# Patient Record
Sex: FEMALE | NOT HISPANIC OR LATINO
[De-identification: names, ages, dates, MRNs, and addresses within clinical notes are randomized per-mention and may not be internally consistent; named-entity substitution may affect disease eponyms.]

---

## 2019-07-09 ENCOUNTER — APPOINTMENT (OUTPATIENT)
Dept: FAMILY MEDICINE | Facility: CLINIC | Age: 34
End: 2019-07-09
Payer: COMMERCIAL

## 2019-07-09 VITALS
WEIGHT: 136 LBS | DIASTOLIC BLOOD PRESSURE: 74 MMHG | HEIGHT: 62.5 IN | RESPIRATION RATE: 16 BRPM | HEART RATE: 77 BPM | SYSTOLIC BLOOD PRESSURE: 102 MMHG | OXYGEN SATURATION: 98 % | BODY MASS INDEX: 24.4 KG/M2

## 2019-07-09 DIAGNOSIS — B34.9 VIRAL INFECTION, UNSPECIFIED: ICD-10-CM

## 2019-07-09 DIAGNOSIS — R09.82 POSTNASAL DRIP: ICD-10-CM

## 2019-07-09 DIAGNOSIS — R23.8 OTHER SKIN CHANGES: ICD-10-CM

## 2019-07-09 PROCEDURE — 99385 PREV VISIT NEW AGE 18-39: CPT

## 2019-07-09 NOTE — PAST MEDICAL HISTORY
[Menstruating] : menstruating [Regular Cycle Intervals] : have been regular [Approximately ___] : the LMP was approximately [unfilled]

## 2019-07-09 NOTE — PLAN
[FreeTextEntry1] : Agreeable to STD testing. \par Not fasting today, will go to lab. \par \par Referral to Gyn to establish care.\par Recommend trial of OTC Hydrocortisone for itching as needed; advised to apply sparingly up to two times per day.  If not improving, Dermatology evaluation. \par \par Trial of flonase for ongoing post-nasal drip symptoms.

## 2019-07-09 NOTE — HISTORY OF PRESENT ILLNESS
[FreeTextEntry1] : Here to establish care/annual physical.  [de-identified] : Here to establish care/annual physical. \par \par Saw Gyn last year. \par Reports skin itching and irritation behind right ear for ~3 months. \par \par Also post-nasal drip symptoms. \par \par Medications and allergies reviewed.\par

## 2019-07-09 NOTE — HEALTH RISK ASSESSMENT
[No] : No [Patient reported PAP Smear was normal] : Patient reported PAP Smear was normal [With Significant Other] : lives with significant other [# of Members in Household ___] :  household currently consist of [unfilled] member(s) [Employed] : employed [] :  [# Of Children ___] : has [unfilled] children [Reports normal functional visual acuity (ie: able to read med bottle)] : Reports normal functional visual acuity [] : No [de-identified] : None [de-identified] : Gynecology-Last July 2018  [de-identified] : Yoga-2x a week; walking  [de-identified] : No pork, no shrimp, no seafood, red meat.  Eats poultry, fruits/vegetables; mostly water [HIV Test offered] : HIV Test offered [Reports changes in dental health] : Reports no changes in dental health [Reports changes in hearing] : Reports no changes in hearing [Reports changes in vision] : Reports no changes in vision [PapSmearDate] : 07/18 [FreeTextEntry2] :   [de-identified] : reading glasses

## 2019-07-09 NOTE — PHYSICAL EXAM
[Normal TMs] : both tympanic membranes were normal [No Lymphadenopathy] : no lymphadenopathy [Supple] : supple [No Edema] : there was no peripheral edema [No Extremity Clubbing/Cyanosis] : no extremity clubbing/cyanosis [Normal Appearance] : normal in appearance [No Masses] : no palpable masses [No Nipple Discharge] : no nipple discharge [No Axillary Lymphadenopathy] : no axillary lymphadenopathy [Normal] : soft, non-tender, non-distended, no masses palpated, no HSM and normal bowel sounds [Normal Affect] : the affect was normal [Alert and Oriented x3] : oriented to person, place, and time [Normal Mood] : the mood was normal [de-identified] : mild erythema, no exudates [Thyroid Normal, No Nodules] : the thyroid was normal and there were no nodules present [de-identified] : erythema and scaling, right posterior ear, no active drainage

## 2019-07-25 LAB
25(OH)D3 SERPL-MCNC: 17.6 NG/ML
ALBUMIN SERPL ELPH-MCNC: 4.5 G/DL
ALP BLD-CCNC: 74 U/L
ALT SERPL-CCNC: 11 U/L
ANION GAP SERPL CALC-SCNC: 13 MMOL/L
AST SERPL-CCNC: 16 U/L
BASOPHILS # BLD AUTO: 0.03 K/UL
BASOPHILS NFR BLD AUTO: 0.4 %
BILIRUB SERPL-MCNC: 0.2 MG/DL
BUN SERPL-MCNC: 10 MG/DL
C TRACH RRNA SPEC QL NAA+PROBE: NOT DETECTED
CALCIUM SERPL-MCNC: 9.6 MG/DL
CHLORIDE SERPL-SCNC: 102 MMOL/L
CHOLEST SERPL-MCNC: 185 MG/DL
CHOLEST/HDLC SERPL: 2.9 RATIO
CO2 SERPL-SCNC: 24 MMOL/L
CREAT SERPL-MCNC: 0.62 MG/DL
EOSINOPHIL # BLD AUTO: 0.06 K/UL
EOSINOPHIL NFR BLD AUTO: 0.9 %
ESTIMATED AVERAGE GLUCOSE: 111 MG/DL
GLUCOSE SERPL-MCNC: 83 MG/DL
HBA1C MFR BLD HPLC: 5.5 %
HBV SURFACE AB SER QL: NONREACTIVE
HCT VFR BLD CALC: 36.2 %
HDLC SERPL-MCNC: 65 MG/DL
HGB BLD-MCNC: 11.7 G/DL
HIV1+2 AB SPEC QL IA.RAPID: NONREACTIVE
HSV 1+2 IGG SER IA-IMP: NEGATIVE
HSV 1+2 IGG SER IA-IMP: POSITIVE
HSV1 IGG SER QL: 31.5 INDEX
HSV2 IGG SER QL: 0.12 INDEX
IMM GRANULOCYTES NFR BLD AUTO: 0.6 %
LDLC SERPL CALC-MCNC: 99 MG/DL
LYMPHOCYTES # BLD AUTO: 1.75 K/UL
LYMPHOCYTES NFR BLD AUTO: 26.1 %
MAN DIFF?: NORMAL
MCHC RBC-ENTMCNC: 29.2 PG
MCHC RBC-ENTMCNC: 32.3 GM/DL
MCV RBC AUTO: 90.3 FL
MEV IGG FLD QL IA: 74.5 AU/ML
MEV IGG+IGM SER-IMP: POSITIVE
MONOCYTES # BLD AUTO: 0.43 K/UL
MONOCYTES NFR BLD AUTO: 6.4 %
MUV AB SER-ACNC: POSITIVE
MUV IGG SER QL IA: 133 AU/ML
N GONORRHOEA RRNA SPEC QL NAA+PROBE: NOT DETECTED
NEUTROPHILS # BLD AUTO: 4.4 K/UL
NEUTROPHILS NFR BLD AUTO: 65.6 %
PLATELET # BLD AUTO: 336 K/UL
POTASSIUM SERPL-SCNC: 4.2 MMOL/L
PROT SERPL-MCNC: 7.7 G/DL
RBC # BLD: 4.01 M/UL
RBC # FLD: 12.9 %
RUBV IGG FLD-ACNC: 4.7 INDEX
RUBV IGG SER-IMP: POSITIVE
SODIUM SERPL-SCNC: 139 MMOL/L
SOURCE AMPLIFICATION: NORMAL
T PALLIDUM AB SER QL IA: NEGATIVE
TRIGL SERPL-MCNC: 105 MG/DL
TSH SERPL-ACNC: 2.79 UIU/ML
VZV AB TITR SER: POSITIVE
VZV IGG SER IF-ACNC: 1086 INDEX
WBC # FLD AUTO: 6.71 K/UL

## 2019-09-04 ENCOUNTER — RESULT REVIEW (OUTPATIENT)
Age: 34
End: 2019-09-04

## 2019-11-25 ENCOUNTER — APPOINTMENT (OUTPATIENT)
Dept: FAMILY MEDICINE | Facility: CLINIC | Age: 34
End: 2019-11-25

## 2020-07-16 ENCOUNTER — APPOINTMENT (OUTPATIENT)
Dept: FAMILY MEDICINE | Facility: CLINIC | Age: 35
End: 2020-07-16
Payer: COMMERCIAL

## 2020-07-16 VITALS
HEART RATE: 88 BPM | TEMPERATURE: 98.4 F | BODY MASS INDEX: 25.07 KG/M2 | OXYGEN SATURATION: 98 % | WEIGHT: 136.25 LBS | DIASTOLIC BLOOD PRESSURE: 80 MMHG | SYSTOLIC BLOOD PRESSURE: 120 MMHG | HEIGHT: 62 IN

## 2020-07-16 DIAGNOSIS — M25.531 PAIN IN RIGHT WRIST: ICD-10-CM

## 2020-07-16 DIAGNOSIS — J30.2 OTHER SEASONAL ALLERGIC RHINITIS: ICD-10-CM

## 2020-07-16 DIAGNOSIS — Z11.59 ENCOUNTER FOR SCREENING FOR OTHER VIRAL DISEASES: ICD-10-CM

## 2020-07-16 LAB — CYTOLOGY CVX/VAG DOC THIN PREP: NORMAL

## 2020-07-16 PROCEDURE — 99395 PREV VISIT EST AGE 18-39: CPT

## 2020-07-16 RX ORDER — NORGESTIMATE AND ETHINYL ESTRADIOL 0.25-0.035
0.25-35 KIT ORAL DAILY
Refills: 0 | Status: DISCONTINUED | COMMUNITY
Start: 2019-07-09 | End: 2020-07-16

## 2020-07-16 NOTE — PLAN
[FreeTextEntry1] : Patient not fasting.  Will go to lab for fasting bloodwork, rx given.\par Recommending Pre- vitamin. \par \par Discussed COVID antibody test.  Discussed precautions.  Advised antibody presence indicates prior exposure/infection.  Not used to diagnose current infection.\par \par Ortho-Hand-possible carpal tunnel. \par Allergy eval for allergy triggers. \par \par Will make follow-up Gyn appointment.

## 2020-07-16 NOTE — PHYSICAL EXAM
[Normal Sclera/Conjunctiva] : normal sclera/conjunctiva [Normal Oropharynx] : the oropharynx was normal [Normal TMs] : both tympanic membranes were normal [No Lymphadenopathy] : no lymphadenopathy [Supple] : supple [Thyroid Normal, No Nodules] : the thyroid was normal and there were no nodules present [No Edema] : there was no peripheral edema [No Extremity Clubbing/Cyanosis] : no extremity clubbing/cyanosis [Normal] : affect was normal and insight and judgment were intact [de-identified] : no shoulder or elbow pain b/l; +tinel's right wrist

## 2020-07-16 NOTE — HISTORY OF PRESENT ILLNESS
[FreeTextEntry1] : Ms. VIRGIL STEIN presents for annual physical.\par  [de-identified] : Ms. VIRGIL BUTTSAngeliacSANJANA presents for annual physical.\par June 27th LMP-trying to conceive.  Stopped OCP. \par 6 months of right wrist pain-right handed.  No shoulder pain. Does Yoga for exercise.  Fingers bother her sometimes as well. \par \par Would like Covid ab testing.\par Medications and allergies reviewed.  Suffering from prolonged seasonal allergy symptoms.  Unsure of trigger. \par  \par

## 2020-07-16 NOTE — HEALTH RISK ASSESSMENT
[No] : No [With Significant Other] : lives with significant other [Patient reported PAP Smear was normal] : Patient reported PAP Smear was normal [Student] : student [# of Members in Household ___] :  household currently consist of [unfilled] member(s) [Employed] : employed [Graduate School] : graduate school [] :  [# Of Children ___] : has [unfilled] children [Fully functional (bathing, dressing, toileting, transferring, walking, feeding)] : Fully functional (bathing, dressing, toileting, transferring, walking, feeding) [Fully functional (using the telephone, shopping, preparing meals, housekeeping, doing laundry, using] : Fully functional and needs no help or supervision to perform IADLs (using the telephone, shopping, preparing meals, housekeeping, doing laundry, using transportation, managing medications and managing finances) [] : No [de-identified] : None [de-identified] : Gyn-2019 [de-identified] : Yoga and walking-3 days a week  [de-identified] : Avoid red meat, no pork, no shrimp, no seafood [HIV test declined] : HIV test declined [Reports changes in vision] : Reports no changes in vision [Reports changes in dental health] : Reports no changes in dental health [PapSmearDate] : 09/19 [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

## 2020-07-16 NOTE — PAST MEDICAL HISTORY
[Menstruating] : menstruating [Definite ___ (Date)] : the last menstrual period was [unfilled] [de-identified] : off sprintec

## 2020-07-16 NOTE — REVIEW OF SYSTEMS
[Postnasal Drip] : postnasal drip [Joint Pain] : joint pain [Negative] : Neurological [Sore Throat] : no sore throat [FreeTextEntry9] : No elbow or back pain

## 2020-07-20 LAB
25(OH)D3 SERPL-MCNC: 8.6 NG/ML
ALBUMIN SERPL ELPH-MCNC: 4.8 G/DL
ALP BLD-CCNC: 80 U/L
ALT SERPL-CCNC: 11 U/L
ANION GAP SERPL CALC-SCNC: 15 MMOL/L
AST SERPL-CCNC: 15 U/L
BASOPHILS # BLD AUTO: 0.02 K/UL
BASOPHILS NFR BLD AUTO: 0.3 %
BILIRUB SERPL-MCNC: 0.2 MG/DL
BUN SERPL-MCNC: 8 MG/DL
CALCIUM SERPL-MCNC: 9.5 MG/DL
CHLORIDE SERPL-SCNC: 103 MMOL/L
CHOLEST SERPL-MCNC: 156 MG/DL
CHOLEST/HDLC SERPL: 2.7 RATIO
CO2 SERPL-SCNC: 22 MMOL/L
CREAT SERPL-MCNC: 0.61 MG/DL
EOSINOPHIL # BLD AUTO: 0.03 K/UL
EOSINOPHIL NFR BLD AUTO: 0.5 %
ESTIMATED AVERAGE GLUCOSE: 108 MG/DL
GLUCOSE SERPL-MCNC: 83 MG/DL
HBA1C MFR BLD HPLC: 5.4 %
HCT VFR BLD CALC: 37.9 %
HDLC SERPL-MCNC: 59 MG/DL
HGB BLD-MCNC: 11.7 G/DL
IMM GRANULOCYTES NFR BLD AUTO: 0.3 %
LDLC SERPL CALC-MCNC: 77 MG/DL
LYMPHOCYTES # BLD AUTO: 1.82 K/UL
LYMPHOCYTES NFR BLD AUTO: 28.8 %
MAN DIFF?: NORMAL
MCHC RBC-ENTMCNC: 29.1 PG
MCHC RBC-ENTMCNC: 30.9 GM/DL
MCV RBC AUTO: 94.3 FL
MONOCYTES # BLD AUTO: 0.45 K/UL
MONOCYTES NFR BLD AUTO: 7.1 %
NEUTROPHILS # BLD AUTO: 3.99 K/UL
NEUTROPHILS NFR BLD AUTO: 63 %
PLATELET # BLD AUTO: 299 K/UL
POTASSIUM SERPL-SCNC: 4.7 MMOL/L
PROT SERPL-MCNC: 7.1 G/DL
RBC # BLD: 4.02 M/UL
RBC # FLD: 13.2 %
SARS-COV-2 IGG SERPL IA-ACNC: <3.8 AU/ML
SARS-COV-2 IGG SERPL QL IA: NEGATIVE
SODIUM SERPL-SCNC: 140 MMOL/L
TRIGL SERPL-MCNC: 100 MG/DL
TSH SERPL-ACNC: 1.92 UIU/ML
WBC # FLD AUTO: 6.33 K/UL

## 2020-07-23 ENCOUNTER — APPOINTMENT (OUTPATIENT)
Dept: ORTHOPEDIC SURGERY | Facility: CLINIC | Age: 35
End: 2020-07-23
Payer: COMMERCIAL

## 2020-07-23 VITALS
HEART RATE: 98 BPM | DIASTOLIC BLOOD PRESSURE: 85 MMHG | HEIGHT: 62 IN | SYSTOLIC BLOOD PRESSURE: 120 MMHG | BODY MASS INDEX: 25.4 KG/M2 | WEIGHT: 138 LBS

## 2020-07-23 VITALS — TEMPERATURE: 98.5 F

## 2020-07-23 DIAGNOSIS — G56.01 CARPAL TUNNEL SYNDROME, RIGHT UPPER LIMB: ICD-10-CM

## 2020-07-23 DIAGNOSIS — Z78.9 OTHER SPECIFIED HEALTH STATUS: ICD-10-CM

## 2020-07-23 DIAGNOSIS — M67.439 GANGLION, UNSPECIFIED WRIST: ICD-10-CM

## 2020-07-23 PROCEDURE — 76881 US COMPL JOINT R-T W/IMG: CPT | Mod: RT

## 2020-07-23 PROCEDURE — 20612 ASPIRATE/INJ GANGLION CYST: CPT | Mod: RT

## 2020-07-23 PROCEDURE — 99204 OFFICE O/P NEW MOD 45 MIN: CPT | Mod: 25

## 2020-07-23 PROCEDURE — 73110 X-RAY EXAM OF WRIST: CPT | Mod: RT

## 2020-08-20 ENCOUNTER — APPOINTMENT (OUTPATIENT)
Dept: ORTHOPEDIC SURGERY | Facility: CLINIC | Age: 35
End: 2020-08-20

## 2020-09-08 ENCOUNTER — RESULT REVIEW (OUTPATIENT)
Age: 35
End: 2020-09-08

## 2021-04-08 ENCOUNTER — ASOB RESULT (OUTPATIENT)
Age: 36
End: 2021-04-08

## 2021-04-08 ENCOUNTER — APPOINTMENT (OUTPATIENT)
Dept: ANTEPARTUM | Facility: CLINIC | Age: 36
End: 2021-04-08
Payer: COMMERCIAL

## 2021-04-08 PROCEDURE — 76811 OB US DETAILED SNGL FETUS: CPT

## 2021-04-08 PROCEDURE — 99072 ADDL SUPL MATRL&STAF TM PHE: CPT

## 2021-05-20 ENCOUNTER — ASOB RESULT (OUTPATIENT)
Age: 36
End: 2021-05-20

## 2021-05-20 ENCOUNTER — APPOINTMENT (OUTPATIENT)
Dept: ANTEPARTUM | Facility: CLINIC | Age: 36
End: 2021-05-20
Payer: COMMERCIAL

## 2021-05-20 PROCEDURE — 76816 OB US FOLLOW-UP PER FETUS: CPT

## 2021-08-21 ENCOUNTER — TRANSCRIPTION ENCOUNTER (OUTPATIENT)
Age: 36
End: 2021-08-21

## 2021-08-22 ENCOUNTER — INPATIENT (INPATIENT)
Facility: HOSPITAL | Age: 36
LOS: 1 days | Discharge: HOME | End: 2021-08-24
Attending: STUDENT IN AN ORGANIZED HEALTH CARE EDUCATION/TRAINING PROGRAM | Admitting: STUDENT IN AN ORGANIZED HEALTH CARE EDUCATION/TRAINING PROGRAM
Payer: COMMERCIAL

## 2021-08-22 VITALS
DIASTOLIC BLOOD PRESSURE: 81 MMHG | OXYGEN SATURATION: 97 % | HEART RATE: 94 BPM | SYSTOLIC BLOOD PRESSURE: 122 MMHG | RESPIRATION RATE: 18 BRPM

## 2021-08-22 DIAGNOSIS — Z3A.00 WEEKS OF GESTATION OF PREGNANCY NOT SPECIFIED: ICD-10-CM

## 2021-08-22 DIAGNOSIS — O26.899 OTHER SPECIFIED PREGNANCY RELATED CONDITIONS, UNSPECIFIED TRIMESTER: ICD-10-CM

## 2021-08-22 DIAGNOSIS — Z34.80 ENCOUNTER FOR SUPERVISION OF OTHER NORMAL PREGNANCY, UNSPECIFIED TRIMESTER: ICD-10-CM

## 2021-08-22 LAB
BASOPHILS # BLD AUTO: 0.03 K/UL — SIGNIFICANT CHANGE UP (ref 0–0.2)
BASOPHILS NFR BLD AUTO: 0.3 % — SIGNIFICANT CHANGE UP (ref 0–2)
BLD GP AB SCN SERPL QL: NEGATIVE — SIGNIFICANT CHANGE UP
COVID-19 SPIKE DOMAIN AB INTERP: POSITIVE
COVID-19 SPIKE DOMAIN ANTIBODY RESULT: >250 U/ML — HIGH
EOSINOPHIL # BLD AUTO: 0.03 K/UL — SIGNIFICANT CHANGE UP (ref 0–0.5)
EOSINOPHIL NFR BLD AUTO: 0.3 % — SIGNIFICANT CHANGE UP (ref 0–6)
HCT VFR BLD CALC: 37 % — SIGNIFICANT CHANGE UP (ref 34.5–45)
HGB BLD-MCNC: 12 G/DL — SIGNIFICANT CHANGE UP (ref 11.5–15.5)
IMM GRANULOCYTES NFR BLD AUTO: 1.8 % — HIGH (ref 0–1.5)
LYMPHOCYTES # BLD AUTO: 18.2 % — SIGNIFICANT CHANGE UP (ref 13–44)
LYMPHOCYTES # BLD AUTO: 2.05 K/UL — SIGNIFICANT CHANGE UP (ref 1–3.3)
MCHC RBC-ENTMCNC: 27.8 PG — SIGNIFICANT CHANGE UP (ref 27–34)
MCHC RBC-ENTMCNC: 32.4 GM/DL — SIGNIFICANT CHANGE UP (ref 32–36)
MCV RBC AUTO: 85.6 FL — SIGNIFICANT CHANGE UP (ref 80–100)
MONOCYTES # BLD AUTO: 0.91 K/UL — HIGH (ref 0–0.9)
MONOCYTES NFR BLD AUTO: 8.1 % — SIGNIFICANT CHANGE UP (ref 2–14)
NEUTROPHILS # BLD AUTO: 8.04 K/UL — HIGH (ref 1.8–7.4)
NEUTROPHILS NFR BLD AUTO: 71.3 % — SIGNIFICANT CHANGE UP (ref 43–77)
NRBC # BLD: 0 /100 WBCS — SIGNIFICANT CHANGE UP (ref 0–0)
PLATELET # BLD AUTO: 273 K/UL — SIGNIFICANT CHANGE UP (ref 150–400)
RBC # BLD: 4.32 M/UL — SIGNIFICANT CHANGE UP (ref 3.8–5.2)
RBC # FLD: 15.3 % — HIGH (ref 10.3–14.5)
RH IG SCN BLD-IMP: POSITIVE — SIGNIFICANT CHANGE UP
SARS-COV-2 IGG+IGM SERPL QL IA: >250 U/ML — HIGH
SARS-COV-2 IGG+IGM SERPL QL IA: POSITIVE
SARS-COV-2 RNA SPEC QL NAA+PROBE: SIGNIFICANT CHANGE UP
WBC # BLD: 11.26 K/UL — HIGH (ref 3.8–10.5)
WBC # FLD AUTO: 11.26 K/UL — HIGH (ref 3.8–10.5)

## 2021-08-22 RX ORDER — CITRIC ACID/SODIUM CITRATE 300-500 MG
15 SOLUTION, ORAL ORAL EVERY 6 HOURS
Refills: 0 | Status: DISCONTINUED | OUTPATIENT
Start: 2021-08-22 | End: 2021-08-23

## 2021-08-22 RX ORDER — METOCLOPRAMIDE HCL 10 MG
10 TABLET ORAL ONCE
Refills: 0 | Status: DISCONTINUED | OUTPATIENT
Start: 2021-08-22 | End: 2021-08-24

## 2021-08-22 RX ORDER — MORPHINE SULFATE 50 MG/1
2 CAPSULE, EXTENDED RELEASE ORAL ONCE
Refills: 0 | Status: DISCONTINUED | OUTPATIENT
Start: 2021-08-23 | End: 2021-08-23

## 2021-08-22 RX ORDER — OXYTOCIN 10 UNIT/ML
2 VIAL (ML) INJECTION
Qty: 30 | Refills: 0 | Status: DISCONTINUED | OUTPATIENT
Start: 2021-08-22 | End: 2021-08-24

## 2021-08-22 RX ORDER — OXYTOCIN 10 UNIT/ML
333.33 VIAL (ML) INJECTION
Qty: 20 | Refills: 0 | Status: DISCONTINUED | OUTPATIENT
Start: 2021-08-22 | End: 2021-08-24

## 2021-08-22 RX ORDER — OXYTOCIN 10 UNIT/ML
4 VIAL (ML) INJECTION
Qty: 30 | Refills: 0 | Status: DISCONTINUED | OUTPATIENT
Start: 2021-08-22 | End: 2021-08-22

## 2021-08-22 RX ORDER — SODIUM CHLORIDE 9 MG/ML
1000 INJECTION, SOLUTION INTRAVENOUS
Refills: 0 | Status: DISCONTINUED | OUTPATIENT
Start: 2021-08-22 | End: 2021-08-24

## 2021-08-22 RX ORDER — SODIUM CHLORIDE 9 MG/ML
1000 INJECTION, SOLUTION INTRAVENOUS
Refills: 0 | Status: DISCONTINUED | OUTPATIENT
Start: 2021-08-22 | End: 2021-08-23

## 2021-08-22 RX ADMIN — Medication 4 MILLIUNIT(S)/MIN: at 06:46

## 2021-08-22 RX ADMIN — Medication 4 MILLIUNIT(S)/MIN: at 08:14

## 2021-08-22 RX ADMIN — Medication 2 MILLIUNIT(S)/MIN: at 17:15

## 2021-08-22 RX ADMIN — SODIUM CHLORIDE 125 MILLILITER(S): 9 INJECTION, SOLUTION INTRAVENOUS at 06:40

## 2021-08-22 RX ADMIN — SODIUM CHLORIDE 125 MILLILITER(S): 9 INJECTION, SOLUTION INTRAVENOUS at 05:50

## 2021-08-22 NOTE — OB PROVIDER H&P - NSHPPHYSICALEXAM_GEN_ALL_CORE
Vital Signs Last 24 Hrs  T(C): --  T(F): --  HR: 83 (22 Aug 2021 05:43) (74 - 97)  BP: 122/81 (22 Aug 2021 04:51) (122/81 - 122/81)  BP(mean): --  RR: 18 (22 Aug 2021 04:43) (18 - 18)  SpO2: 94% (22 Aug 2021 05:43) (94% - 98%)    EFM: , mod lance, +accel, no decel  [place]: Q2-3m    VE: 1-2/70/-3, +Pooling, +nitrazine  Sono: Vtx

## 2021-08-22 NOTE — OB PROVIDER H&P - HISTORY OF PRESENT ILLNESS
The patient is a 37YO  @ 40wks presenting w/ LOF at 9am. She said it was accompanied by some spotting. She has also been having ctx Q3-5m. PNC uncomplicated. GBS neg. EFW 3400.    OBHx: G1  GYN: Denies  PMSH: Denies  Meds: PNV  All: NKDA  Soc: Denies  FHx: Denies

## 2021-08-22 NOTE — OB RN DELIVERY SUMMARY - NS_SEPSISRSKCALC_OBGYN_ALL_OB_FT
No temperature has been documented for this patient in CPN or on the OB Flowsheet. Ensure the highest temperature during labor was documented on the OB Flowsheet.  No gestational age at birth has been documented. Ensure delivery date/time has been entered above.  Rupture of membranes must be entered above.   EOS calculated successfully. EOS Risk Factor: 0.5/1000 live births ([facility] national incidence); GA=40w;Temp=99.32; ROM=26.117; GBS='Negative'; Antibiotics='No antibiotics or any antibiotics < 2 hrs prior to birth'

## 2021-08-22 NOTE — OB PROVIDER LABOR PROGRESS NOTE - ASSESSMENT
35 yo  @40+0 in labor and now fully dilated and feeling pressure to push.    - start pushing with each contraction  - FHT Cat I: continue to monitor    Dr. Festus Johns, PGY1 37 yo  @40+0 in labor and now fully dilated and feeling pressure to push.    - start pushing with each contraction  - FHT Cat I: continue to monitor  - Anticipate , continue pit    Dr. Festus Johns, PGY1

## 2021-08-22 NOTE — OB NEONATOLOGY/PEDIATRICIAN DELIVERY SUMMARY - NSPEDSNEONOTESA_OBGYN_ALL_OB_FT
Baby is a 40 wk GA Female born to a 37 y/o  mother via C/S for failure to progress. Maternal history uncomplicated. Prenatal history uncomplicated. Maternal BT O+. PNL neg, NR, and immune. GBS neg on 8/3. SROM at 2100 on 21, clear fluids which eventually became meconium stained. Baby born vigorous and crying spontaneously with nuchal x 1. WDSS. Apgars 9/9. EOS 0.39 (Maternal temp 37.4C). Mom plans to breastfeed, would like hepB. COVID status neg.     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

## 2021-08-22 NOTE — OB PROVIDER LABOR PROGRESS NOTE - ASSESSMENT
A/P:  -EFM/[place]  -Continue pitocin  -Peanut ball placed  -Anticipate   Dr. Mortensen in house  Irlanda Tijerina PA-C

## 2021-08-22 NOTE — OB RN DELIVERY SUMMARY - NS_LABORCHARACTER_OBGYN_ALL_OB
Induction of labor-Medicinal/Augmentation of labor/External electronic FM Induction of labor-Medicinal/Augmentation of labor/External electronic FM/Meconium staining

## 2021-08-22 NOTE — OB PROVIDER LABOR PROGRESS NOTE - NS_SUBJECTIVE/OBJECTIVE_OBGYN_ALL_OB_FT
2120 - late entry due to clinical responsibilities    patient evaluated for pushing. Good pushing effort. Baby LOP position with low pubic arch. Attempts to turn the baby to OA was attempted but unsuccessful. Cat I tracing throughout. To continue pushing. Reviewed with the patient the position. We discussed that its possible to deliver OP but more challenging. To continue pushing. Will reevaluate for decent and progress
patient evaluated for head descent. Pushing for almost 2 hours and there is been no fetal head descent. Reviewed this with the patient. Cat I tracing. Discussed that P0 with epidurals can push for 4 hours the studies show. However at this time, there should have been some progress and descent of the head. Reviewed that since Cat I tracing we can continue to push but that there should have been some descent. Patient agrees with my recommendation for CS at this time.  and patient in agreement. will proceed with CS    Sada Mortensen DO
Patient reported the urge to push. Examined with RN in room, patient found to be 10/100/+1
Patient has been feeling more rectal pressure for the past 20 minutes. Examined patient with RN in room.
patient comfortable with epidural. patient evaluated due to 3-4 min decel which recovered with moderate variability. Pitocin was on 10 and turned off.
PA Note:  Patient seen and evaluated at bedside. Patient c/o increased pressure.
patient feels well with epidural. pitocin on 6. feels some pelvic pressure

## 2021-08-22 NOTE — OB RN INTRAOPERATIVE NOTE - NSSELHIDDEN_OBGYN_ALL_OB_FT
[NS_DeliveryAttending1_OBGYN_ALL_OB_FT:[email or web address]=],[NS_DeliveryAssist1_OBGYN_ALL_OB_FT:[id number]=],[NS_DeliveryRN_OBGYN_ALL_OB_FT:[email or web address]=]

## 2021-08-22 NOTE — OB PROVIDER LABOR PROGRESS NOTE - NS_OBIHIFHRDETAILS_OBGYN_ALL_OB_FT
Cat II with decel that recovered with moderate variability
140/mod variability/ + accels/ - decels: Cat I
Cat I
Cat I
135/mod variability/+ accels/- decels: Cat I FHT
140/moderate variability/+accels/no decels

## 2021-08-22 NOTE — OB PROVIDER H&P - ATTENDING COMMENTS
Agree with above. patient P0 SROM in early labor. Patient does not want epidural. Pregnancy course uncomplicated.     EFW 3600  EFM: Cat I  TOCO: 2-3 in ten  Pitocin on 12  SVE 3/100/-2  Forebag palpated and AROM light mec    Plan:  P0 admitted SROM with now light mec  - Continue pitocin  - Epidural if desired  - Consents signed. risks reviewed for vagina, operative vaginal and  section.   - Anticipate     Sada Mortensen DO

## 2021-08-22 NOTE — OB RN DELIVERY SUMMARY - NSSELHIDDEN_OBGYN_ALL_OB_FT
[NS_DeliveryAttending1_OBGYN_ALL_OB_FT:[email or web address]=],[NS_DeliveryAssist1_OBGYN_ALL_OB_FT:[id number]=],[NS_DeliveryRN_OBGYN_ALL_OB_FT:[email or web address]=]

## 2021-08-22 NOTE — OB PROVIDER LABOR PROGRESS NOTE - ASSESSMENT
35 yo  @40+0 in labor with PROM @9p comfortable and making cervical change.    - FHT Cat I, continue external monitoring  - Re-examine when patient feels more pressure/contractions      WILLIAN Johns, PGY1

## 2021-08-22 NOTE — OB PROVIDER LABOR PROGRESS NOTE - ASSESSMENT
Pitocin turned off. patient on side. Making cervical change. Will keep pitocin off until Cat I then restart    Sada Mortensen DO

## 2021-08-22 NOTE — OB PROVIDER H&P - ASSESSMENT
The patient is a 37YO  @ 40w presenting in early labor. She PROM 9p.  -Admit to L&D, routine, labs, IVF  -EFM + Coy cat 1   -PNC: uncomplicated  -GBS neg  -EFW 3300  -IOL w/ pitocin  -Low risk DVT/PPH    msantandreu pgy4 d/w Dr. Mortensen

## 2021-08-23 ENCOUNTER — TRANSCRIPTION ENCOUNTER (OUTPATIENT)
Age: 36
End: 2021-08-23

## 2021-08-23 LAB
BASOPHILS # BLD AUTO: 0.2 K/UL — SIGNIFICANT CHANGE UP (ref 0–0.2)
BASOPHILS NFR BLD AUTO: 0.9 % — SIGNIFICANT CHANGE UP (ref 0–2)
EOSINOPHIL # BLD AUTO: 0 K/UL — SIGNIFICANT CHANGE UP (ref 0–0.5)
EOSINOPHIL NFR BLD AUTO: 0 % — SIGNIFICANT CHANGE UP (ref 0–6)
HCT VFR BLD CALC: 24.4 % — LOW (ref 34.5–45)
HCT VFR BLD CALC: 25.7 % — LOW (ref 34.5–45)
HCT VFR BLD CALC: 26.4 % — LOW (ref 34.5–45)
HGB BLD-MCNC: 7.8 G/DL — LOW (ref 11.5–15.5)
HGB BLD-MCNC: 8.4 G/DL — LOW (ref 11.5–15.5)
HGB BLD-MCNC: 8.6 G/DL — LOW (ref 11.5–15.5)
LYMPHOCYTES # BLD AUTO: 0.2 K/UL — LOW (ref 1–3.3)
LYMPHOCYTES # BLD AUTO: 0.9 % — LOW (ref 13–44)
MANUAL SMEAR VERIFICATION: SIGNIFICANT CHANGE UP
MCHC RBC-ENTMCNC: 28.3 PG — SIGNIFICANT CHANGE UP (ref 27–34)
MCHC RBC-ENTMCNC: 29 PG — SIGNIFICANT CHANGE UP (ref 27–34)
MCHC RBC-ENTMCNC: 29 PG — SIGNIFICANT CHANGE UP (ref 27–34)
MCHC RBC-ENTMCNC: 32 GM/DL — SIGNIFICANT CHANGE UP (ref 32–36)
MCHC RBC-ENTMCNC: 32.6 GM/DL — SIGNIFICANT CHANGE UP (ref 32–36)
MCHC RBC-ENTMCNC: 32.7 GM/DL — SIGNIFICANT CHANGE UP (ref 32–36)
MCV RBC AUTO: 88.4 FL — SIGNIFICANT CHANGE UP (ref 80–100)
MCV RBC AUTO: 88.6 FL — SIGNIFICANT CHANGE UP (ref 80–100)
MCV RBC AUTO: 88.9 FL — SIGNIFICANT CHANGE UP (ref 80–100)
METAMYELOCYTES # FLD: 0.9 % — HIGH (ref 0–0)
MONOCYTES # BLD AUTO: 1.13 K/UL — HIGH (ref 0–0.9)
MONOCYTES NFR BLD AUTO: 5.2 % — SIGNIFICANT CHANGE UP (ref 2–14)
NEUTROPHILS # BLD AUTO: 20.07 K/UL — HIGH (ref 1.8–7.4)
NEUTROPHILS NFR BLD AUTO: 90.4 % — HIGH (ref 43–77)
NEUTS BAND # BLD: 1.7 % — SIGNIFICANT CHANGE UP (ref 0–8)
NRBC # BLD: 0 /100 WBCS — SIGNIFICANT CHANGE UP (ref 0–0)
NRBC # BLD: 0 /100 WBCS — SIGNIFICANT CHANGE UP (ref 0–0)
PLAT MORPH BLD: NORMAL — SIGNIFICANT CHANGE UP
PLATELET # BLD AUTO: 209 K/UL — SIGNIFICANT CHANGE UP (ref 150–400)
PLATELET # BLD AUTO: 210 K/UL — SIGNIFICANT CHANGE UP (ref 150–400)
PLATELET # BLD AUTO: 221 K/UL — SIGNIFICANT CHANGE UP (ref 150–400)
RBC # BLD: 2.76 M/UL — LOW (ref 3.8–5.2)
RBC # BLD: 2.9 M/UL — LOW (ref 3.8–5.2)
RBC # BLD: 2.97 M/UL — LOW (ref 3.8–5.2)
RBC # FLD: 15.8 % — HIGH (ref 10.3–14.5)
RBC # FLD: 15.9 % — HIGH (ref 10.3–14.5)
RBC # FLD: 16 % — HIGH (ref 10.3–14.5)
RBC BLD AUTO: SIGNIFICANT CHANGE UP
T PALLIDUM AB TITR SER: NEGATIVE — SIGNIFICANT CHANGE UP
WBC # BLD: 21.79 K/UL — HIGH (ref 3.8–10.5)
WBC # BLD: 22.13 K/UL — HIGH (ref 3.8–10.5)
WBC # BLD: 24.05 K/UL — HIGH (ref 3.8–10.5)
WBC # FLD AUTO: 21.79 K/UL — HIGH (ref 3.8–10.5)
WBC # FLD AUTO: 22.13 K/UL — HIGH (ref 3.8–10.5)
WBC # FLD AUTO: 24.05 K/UL — HIGH (ref 3.8–10.5)

## 2021-08-23 RX ORDER — FERROUS SULFATE 325(65) MG
325 TABLET ORAL DAILY
Refills: 0 | Status: DISCONTINUED | OUTPATIENT
Start: 2021-08-23 | End: 2021-08-24

## 2021-08-23 RX ORDER — SIMETHICONE 80 MG/1
80 TABLET, CHEWABLE ORAL EVERY 4 HOURS
Refills: 0 | Status: DISCONTINUED | OUTPATIENT
Start: 2021-08-23 | End: 2021-08-24

## 2021-08-23 RX ORDER — KETOROLAC TROMETHAMINE 30 MG/ML
30 SYRINGE (ML) INJECTION EVERY 6 HOURS
Refills: 0 | Status: DISCONTINUED | OUTPATIENT
Start: 2021-08-23 | End: 2021-08-24

## 2021-08-23 RX ORDER — DIPHENHYDRAMINE HCL 50 MG
25 CAPSULE ORAL EVERY 6 HOURS
Refills: 0 | Status: DISCONTINUED | OUTPATIENT
Start: 2021-08-23 | End: 2021-08-24

## 2021-08-23 RX ORDER — FOLIC ACID 0.8 MG
1 TABLET ORAL DAILY
Refills: 0 | Status: DISCONTINUED | OUTPATIENT
Start: 2021-08-23 | End: 2021-08-24

## 2021-08-23 RX ORDER — MAGNESIUM HYDROXIDE 400 MG/1
30 TABLET, CHEWABLE ORAL
Refills: 0 | Status: DISCONTINUED | OUTPATIENT
Start: 2021-08-23 | End: 2021-08-24

## 2021-08-23 RX ORDER — LANOLIN
1 OINTMENT (GRAM) TOPICAL EVERY 6 HOURS
Refills: 0 | Status: DISCONTINUED | OUTPATIENT
Start: 2021-08-23 | End: 2021-08-24

## 2021-08-23 RX ORDER — OXYCODONE HYDROCHLORIDE 5 MG/1
5 TABLET ORAL ONCE
Refills: 0 | Status: DISCONTINUED | OUTPATIENT
Start: 2021-08-23 | End: 2021-08-24

## 2021-08-23 RX ORDER — OXYTOCIN 10 UNIT/ML
333.33 VIAL (ML) INJECTION
Qty: 20 | Refills: 0 | Status: DISCONTINUED | OUTPATIENT
Start: 2021-08-23 | End: 2021-08-24

## 2021-08-23 RX ORDER — HEPARIN SODIUM 5000 [USP'U]/ML
5000 INJECTION INTRAVENOUS; SUBCUTANEOUS EVERY 12 HOURS
Refills: 0 | Status: DISCONTINUED | OUTPATIENT
Start: 2021-08-23 | End: 2021-08-24

## 2021-08-23 RX ORDER — OXYCODONE HYDROCHLORIDE 5 MG/1
5 TABLET ORAL
Refills: 0 | Status: DISCONTINUED | OUTPATIENT
Start: 2021-08-23 | End: 2021-08-24

## 2021-08-23 RX ORDER — SODIUM CHLORIDE 9 MG/ML
1000 INJECTION, SOLUTION INTRAVENOUS
Refills: 0 | Status: DISCONTINUED | OUTPATIENT
Start: 2021-08-23 | End: 2021-08-24

## 2021-08-23 RX ORDER — ASCORBIC ACID 60 MG
500 TABLET,CHEWABLE ORAL THREE TIMES A DAY
Refills: 0 | Status: DISCONTINUED | OUTPATIENT
Start: 2021-08-23 | End: 2021-08-24

## 2021-08-23 RX ORDER — IBUPROFEN 200 MG
600 TABLET ORAL EVERY 6 HOURS
Refills: 0 | Status: COMPLETED | OUTPATIENT
Start: 2021-08-23 | End: 2022-07-22

## 2021-08-23 RX ORDER — ACETAMINOPHEN 500 MG
975 TABLET ORAL
Refills: 0 | Status: DISCONTINUED | OUTPATIENT
Start: 2021-08-23 | End: 2021-08-24

## 2021-08-23 RX ORDER — TETANUS TOXOID, REDUCED DIPHTHERIA TOXOID AND ACELLULAR PERTUSSIS VACCINE, ADSORBED 5; 2.5; 8; 8; 2.5 [IU]/.5ML; [IU]/.5ML; UG/.5ML; UG/.5ML; UG/.5ML
0.5 SUSPENSION INTRAMUSCULAR ONCE
Refills: 0 | Status: DISCONTINUED | OUTPATIENT
Start: 2021-08-23 | End: 2021-08-24

## 2021-08-23 RX ADMIN — Medication 500 MILLIGRAM(S): at 15:15

## 2021-08-23 RX ADMIN — Medication 975 MILLIGRAM(S): at 03:39

## 2021-08-23 RX ADMIN — Medication 30 MILLIGRAM(S): at 19:45

## 2021-08-23 RX ADMIN — Medication 1 MILLIGRAM(S): at 13:04

## 2021-08-23 RX ADMIN — SODIUM CHLORIDE 75 MILLILITER(S): 9 INJECTION, SOLUTION INTRAVENOUS at 15:16

## 2021-08-23 RX ADMIN — HEPARIN SODIUM 5000 UNIT(S): 5000 INJECTION INTRAVENOUS; SUBCUTANEOUS at 21:18

## 2021-08-23 RX ADMIN — Medication 975 MILLIGRAM(S): at 21:45

## 2021-08-23 RX ADMIN — Medication 975 MILLIGRAM(S): at 15:15

## 2021-08-23 RX ADMIN — Medication 30 MILLIGRAM(S): at 13:30

## 2021-08-23 RX ADMIN — Medication 975 MILLIGRAM(S): at 09:27

## 2021-08-23 RX ADMIN — Medication 975 MILLIGRAM(S): at 04:09

## 2021-08-23 RX ADMIN — Medication 325 MILLIGRAM(S): at 13:04

## 2021-08-23 RX ADMIN — Medication 975 MILLIGRAM(S): at 15:45

## 2021-08-23 RX ADMIN — Medication 975 MILLIGRAM(S): at 21:17

## 2021-08-23 RX ADMIN — HEPARIN SODIUM 5000 UNIT(S): 5000 INJECTION INTRAVENOUS; SUBCUTANEOUS at 09:27

## 2021-08-23 RX ADMIN — Medication 30 MILLIGRAM(S): at 06:10

## 2021-08-23 RX ADMIN — Medication 30 MILLIGRAM(S): at 13:03

## 2021-08-23 RX ADMIN — Medication 30 MILLIGRAM(S): at 20:15

## 2021-08-23 RX ADMIN — Medication 500 MILLIGRAM(S): at 21:18

## 2021-08-23 RX ADMIN — Medication 975 MILLIGRAM(S): at 10:00

## 2021-08-23 RX ADMIN — Medication 1 TABLET(S): at 15:14

## 2021-08-23 RX ADMIN — Medication 30 MILLIGRAM(S): at 05:40

## 2021-08-23 NOTE — OB PROVIDER DELIVERY SUMMARY - NSSELHIDDEN_OBGYN_ALL_OB_FT
[NS_DeliveryAttending1_OBGYN_ALL_OB_FT:[email or web address]=],[NS_DeliveryAssist1_OBGYN_ALL_OB_FT:[id number]=],[NS_DeliveryRN_OBGYN_ALL_OB_FT:[email or web address]=]

## 2021-08-23 NOTE — DISCHARGE NOTE OB - CARE PROVIDER_API CALL
Sada Mortensen (DO)  [facility] [address], [address]  [place] [address]  Phone: [phone]  Fax: [phone]  Follow Up Time:

## 2021-08-23 NOTE — DISCHARGE NOTE OB - PLAN OF CARE
can take motrin 600mg every 6 hours along with 1000mg tylenol every 6 hours. pain control and ambulation continue iron and prenatal vitamins 30-60 days can take motrin 600mg every 6 hours along with 1000mg tylenol every 6 hours. pain control and ambulation  After discharge, please stay on pelvic rest for 6 weeks, meaning no sexual intercourse, no tampons and no douching.  No driving for 2 weeks as women can loose a lot of blood during delivery and there is a possibility of being lightheaded/fainting.  No lifting objects heavier than baby for two weeks.  Expect to have vaginal bleeding/spotting for up to six weeks.  The bleeding should get lighter and more white/light brown with time.  For bleeding soaking more than a pad an hour or passing clots greater than the size of your fist, come in to the emergency department.    Follow up in the office in 2 weeks for incision check.  Call your OBGYN for noticeable increase in redness or swelling at incision, discharge from incision, or opening of skin at incision site.

## 2021-08-23 NOTE — OB PROVIDER DELIVERY SUMMARY - NSPROVIDERDELIVERYNOTE_OBGYN_ALL_OB_FT
primary LTCS, uncomplicated  viable female infant, vertex presentation, Apgars 9/9, cord gasses sent  grossly normal fallopian tubes, uterus, and ovaries  uterus closed in 2 layer with caprosyn    EBL: 1200  IVF: 1700  UOP: 350    Bakari Davila PGY-2  w/ Dr. Mortensen primary LTCS, uncomplicated  viable female infant, vertex presentation, Apgars 9/9, 3784g, cord gasses sent  grossly normal fallopian tubes, uterus, and ovaries. Extra 40u pitocin given for uterine atony.   uterus closed in 2 layer with caprosyn    EBL: 1200  IVF: 1700  UOP: 350    Bakari Davila PGY-2  w/ Dr. Mortensen primary LTCS, uncomplicated  viable female infant, vertex presentation, Apgars 9/9, 3784g, cord gasses sent  grossly normal fallopian tubes, uterus, and ovaries. Extra 40u pitocin given for uterine atony.   uterus closed in 2 layer with caprosyn. Peritoneal defect noted by bladder and repaired with 2-0 vicryl.    EBL: 1200  IVF: 1700  UOP: 350    Bakari Davila PGY-2  w/ Dr. Mortensen

## 2021-08-23 NOTE — CHART NOTE - NSCHARTNOTEFT_GEN_A_CORE
PA NOTE     POD#1     Vital Signs Last 24 Hrs  T(C): 36.8 (23 Aug 2021 08:46), Max: 37.4 (22 Aug 2021 22:01)  T(F): 98.2 (23 Aug 2021 08:46), Max: 99.32 (22 Aug 2021 22:01)  HR: 92 (23 Aug 2021 08:46) (62 - 116)  BP: 99/63 (23 Aug 2021 08:46) (87/50 - 126/58)  BP(mean): 66 (23 Aug 2021 02:25) (66 - 81)  RR: 18 (23 Aug 2021 08:46) (16 - 20)  SpO2: 98% (23 Aug 2021 08:46) (74% - 100%)                          8.6    21.79 )-----------( 209      ( 23 Aug 2021 07:32 )             26.4     8.6/26.4    A- anemia of acute blood loss not requiring blood transfusion. Pt stable  Plan:  - Ferrous Sulfate,  Vitamin C supplementation.  - Repeat CBC @1pm  - Monitor for signs/symptoms of anemia.   Yan Davies who is in house

## 2021-08-23 NOTE — DISCHARGE NOTE OB - PATIENT PORTAL LINK FT
You can access the FollowMyHealth Patient Portal offered by [facility] by registering at the following website: [email or web address]. By joining [employer]’s FollowMyHealth portal, you will also be able to view your health information using other applications (apps) compatible with our system.

## 2021-08-23 NOTE — DISCHARGE NOTE OB - MATERIALS PROVIDED
[facility] [place] Screening Program/Bottle Feeding Log/Guide to Postpartum Care/[facility] Hearing Screen Program/Back To Sleep Handout/Birth Certificate Instructions

## 2021-08-23 NOTE — DISCHARGE NOTE OB - ADDITIONAL INSTRUCTIONS
Please call if you have fever, signs of infection, heavy bleeding vaginally, pain uncontrolled with pain medicine

## 2021-08-23 NOTE — DISCHARGE NOTE OB - CARE PLAN
1 Principal Discharge DX:	Single delivery by  section  Assessment and plan of treatment:	can take motrin 600mg every 6 hours along with 1000mg tylenol every 6 hours. pain control and ambulation   Principal Discharge DX:	Single delivery by  section  Assessment and plan of treatment:	can take motrin 600mg every 6 hours along with 1000mg tylenol every 6 hours. pain control and ambulation  After discharge, please stay on pelvic rest for 6 weeks, meaning no sexual intercourse, no tampons and no douching.  No driving for 2 weeks as women can loose a lot of blood during delivery and there is a possibility of being lightheaded/fainting.  No lifting objects heavier than baby for two weeks.  Expect to have vaginal bleeding/spotting for up to six weeks.  The bleeding should get lighter and more white/light brown with time.  For bleeding soaking more than a pad an hour or passing clots greater than the size of your fist, come in to the emergency department.    Follow up in the office in 2 weeks for incision check.  Call your OBGYN for noticeable increase in redness or swelling at incision, discharge from incision, or opening of skin at incision site.  Secondary Diagnosis:	Anemia due to acute blood loss  Assessment and plan of treatment:	continue iron and prenatal vitamins 30-60 days

## 2021-08-23 NOTE — DISCHARGE NOTE OB - HOSPITAL COURSE
Patient came in SROM. Augmented. Labor course complicated by arrest of descent and a CS performed. post partum course uncomplicated. Patient came in SROM. Augmented. Labor course complicated by arrest of descent and a CS performed. post partum course uncomplicated except for acute blood loss anemia.

## 2021-08-24 VITALS
OXYGEN SATURATION: 98 % | SYSTOLIC BLOOD PRESSURE: 110 MMHG | RESPIRATION RATE: 18 BRPM | DIASTOLIC BLOOD PRESSURE: 77 MMHG | HEART RATE: 88 BPM | TEMPERATURE: 98 F

## 2021-08-24 PROCEDURE — 85025 COMPLETE CBC W/AUTO DIFF WBC: CPT

## 2021-08-24 PROCEDURE — 86900 BLOOD TYPING SEROLOGIC ABO: CPT

## 2021-08-24 PROCEDURE — 86901 BLOOD TYPING SEROLOGIC RH(D): CPT

## 2021-08-24 PROCEDURE — 87635 SARS-COV-2 COVID-19 AMP PRB: CPT

## 2021-08-24 PROCEDURE — 59025 FETAL NON-STRESS TEST: CPT

## 2021-08-24 PROCEDURE — 86780 TREPONEMA PALLIDUM: CPT

## 2021-08-24 PROCEDURE — 59050 FETAL MONITOR W/REPORT: CPT

## 2021-08-24 PROCEDURE — G0463: CPT

## 2021-08-24 PROCEDURE — 86850 RBC ANTIBODY SCREEN: CPT

## 2021-08-24 PROCEDURE — 85027 COMPLETE CBC AUTOMATED: CPT

## 2021-08-24 PROCEDURE — 86769 SARS-COV-2 COVID-19 ANTIBODY: CPT

## 2021-08-24 RX ORDER — ACETAMINOPHEN 500 MG
2 TABLET ORAL
Qty: 0 | Refills: 0 | DISCHARGE
Start: 2021-08-24

## 2021-08-24 RX ORDER — IBUPROFEN 200 MG
600 TABLET ORAL EVERY 6 HOURS
Refills: 0 | Status: DISCONTINUED | OUTPATIENT
Start: 2021-08-24 | End: 2021-08-24

## 2021-08-24 RX ORDER — IBUPROFEN 200 MG
1 TABLET ORAL
Qty: 0 | Refills: 0 | DISCHARGE
Start: 2021-08-24

## 2021-08-24 RX ORDER — FERROUS SULFATE 325(65) MG
1 TABLET ORAL
Qty: 0 | Refills: 0 | DISCHARGE
Start: 2021-08-24

## 2021-08-24 RX ADMIN — Medication 325 MILLIGRAM(S): at 11:40

## 2021-08-24 RX ADMIN — Medication 600 MILLIGRAM(S): at 11:41

## 2021-08-24 RX ADMIN — Medication 975 MILLIGRAM(S): at 10:20

## 2021-08-24 RX ADMIN — Medication 1 TABLET(S): at 11:40

## 2021-08-24 RX ADMIN — Medication 600 MILLIGRAM(S): at 06:27

## 2021-08-24 RX ADMIN — Medication 500 MILLIGRAM(S): at 06:27

## 2021-08-24 RX ADMIN — Medication 975 MILLIGRAM(S): at 14:33

## 2021-08-24 RX ADMIN — Medication 30 MILLIGRAM(S): at 01:30

## 2021-08-24 RX ADMIN — HEPARIN SODIUM 5000 UNIT(S): 5000 INJECTION INTRAVENOUS; SUBCUTANEOUS at 09:47

## 2021-08-24 RX ADMIN — Medication 975 MILLIGRAM(S): at 15:00

## 2021-08-24 RX ADMIN — Medication 975 MILLIGRAM(S): at 09:46

## 2021-08-24 RX ADMIN — Medication 600 MILLIGRAM(S): at 12:10

## 2021-08-24 RX ADMIN — Medication 30 MILLIGRAM(S): at 01:04

## 2021-08-24 NOTE — PROGRESS NOTE ADULT - SUBJECTIVE AND OBJECTIVE BOX
Acute OB Pain Service Note    POD#1 S/P  Section    Subjective: Patient is doing well.    The patient received Duramorph  3 mg via epidural.    Epidural catheter removed yesterday.  Epidural site is: without erythema, discharge, edema, and is nontender.    The patient is afebrile, alert and oriented    Side Effects: No PONV, No Pruritis, No Numbness, No Neuromuscular deficits noted.  Continue prn p.o. analgesics
OB Postpartum Note:  Delivery    S: 35yo now POD #2 s/p LTCS. Her pain is well controlled. She is tolerating a regular diet and is passing flatus. Voiding spontaneously and ambulating without difficulty. Denies N/V. Denies CP/SOB/lightheadedness/dizziness.     O:   Vitals:  Vital Signs Last 24 Hrs  T(C): 36.6 (24 Aug 2021 05:20), Max: 37 (23 Aug 2021 13:25)  T(F): 97.8 (24 Aug 2021 05:20), Max: 98.6 (23 Aug 2021 13:25)  HR: 75 (24 Aug 2021 05:20) (75 - 92)  BP: 106/71 (24 Aug 2021 05:20) (98/63 - 106/71)  BP(mean): --  RR: 18 (24 Aug 2021 05:20) (18 - 18)  SpO2: 96% (24 Aug 2021 05:20) (96% - 98%)    MEDICATIONS  (STANDING):  acetaminophen   Tablet .. 975 milliGRAM(s) Oral <User Schedule>  ascorbic acid 500 milliGRAM(s) Oral three times a day  dextrose 5% + lactated ringers. 1000 milliLiter(s) (125 mL/Hr) IV Continuous <Continuous>  diphtheria/tetanus/pertussis (acellular) Vaccine (ADAcel) 0.5 milliLiter(s) IntraMuscular once  ferrous    sulfate 325 milliGRAM(s) Oral daily  folic acid 1 milliGRAM(s) Oral daily  heparin   Injectable 5000 Unit(s) SubCutaneous every 12 hours  ibuprofen  Tablet. 600 milliGRAM(s) Oral every 6 hours  lactated ringers. 1000 milliLiter(s) (75 mL/Hr) IV Continuous <Continuous>  metoclopramide Injectable 10 milliGRAM(s) IV Push once  oxytocin Infusion 333.333 milliUNIT(s)/Min (1000 mL/Hr) IV Continuous <Continuous>  oxytocin Infusion 333.333 milliUNIT(s)/Min (1000 mL/Hr) IV Continuous <Continuous>  oxytocin Infusion. 2 milliUNIT(s)/Min (2 mL/Hr) IV Continuous <Continuous>  prenatal multivitamin 1 Tablet(s) Oral daily    MEDICATIONS  (PRN):  diphenhydrAMINE 25 milliGRAM(s) Oral every 6 hours PRN Pruritus  lanolin Ointment 1 Application(s) Topical every 6 hours PRN Sore Nipples  magnesium hydroxide Suspension 30 milliLiter(s) Oral two times a day PRN Constipation  oxyCODONE    IR 5 milliGRAM(s) Oral every 3 hours PRN Moderate to Severe Pain (4-10)  oxyCODONE    IR 5 milliGRAM(s) Oral once PRN Moderate to Severe Pain (4-10)  simethicone 80 milliGRAM(s) Chew every 4 hours PRN Gas      Labs:  Blood type: O Positive  Rubella IgG: RPR: Negative                          8.4<L>   24.05<H> >-----------< 221    (  @ 19:27 )             25.7<L>                        7.8<L>   22.13<H> >-----------< 210    (  @ 13:22 )             24.4<L>                        8.6<L>   21.79<H> >-----------< 209    (  @ 07:32 )             26.4<L>                        12.0   11.26<H> >-----------< 273    (  @ 06:04 )             37.0                  PE:  General: NAD, patient resting comfortably in bed  Abdomen: Mildly distended, appropriately tender. No rebound tenderness or guarding. Incision c/d/i.  Extremities: SCDs in place, no erythema  
OB Attending Note      S: Pt POD1 from CS. feeling well, pain controlled    Physical exam:    Vital Signs Last 24 Hrs  T(C): 36.8 (23 Aug 2021 08:46), Max: 37.4 (22 Aug 2021 22:01)  T(F): 98.2 (23 Aug 2021 08:46), Max: 99.32 (22 Aug 2021 22:01)  HR: 92 (23 Aug 2021 08:46) (62 - 116)  BP: 99/63 (23 Aug 2021 08:46) (87/50 - 126/58)  BP(mean): 66 (23 Aug 2021 02:25) (66 - 81)  RR: 18 (23 Aug 2021 08:46) (16 - 20)  SpO2: 98% (23 Aug 2021 08:46) (74% - 100%)  I&O's Summary    22 Aug 2021 07:01  -  23 Aug 2021 07:00  --------------------------------------------------------  IN: 5000 mL / OUT: 2300 mL / NET: 2700 mL        Gen: NAD  Breast: Soft, nontender, not engorged.  Abdomen: Soft, nontender, no distension , firm uterine fundus at umbilicus.  Incision: Clean, dry, and intact  Scant Lochia  Ext: No calf tenderness    LABS:                        8.6    21.79 )-----------( 209      ( 23 Aug 2021 07:32 )             26.4                         12.0   11.26 )-----------( 273      ( 22 Aug 2021 06:04 )             37.0

## 2021-08-24 NOTE — PROGRESS NOTE ADULT - ATTENDING COMMENTS
POD2 s/p1'  section, doing well  #ACUTE BLOOD LOSS ANEMIA- continue iron and prenatal vitamins  -Routine postoperative care  -Discharge home   -Instructions given   -Follow-up in office 2 weeks    Vidhi Brandt MD

## 2021-08-24 NOTE — PROGRESS NOTE ADULT - ASSESSMENT
Assessment and Plan    POD #1 s/p  section  Doing well.  drop in H/H likely due to acute blood loss anemia. Will repeat at 1PM  Continue Ambulation/OOB/Venodynes/heparin  Cont Pain medications  Regular diet      Elizabeth Davies, 
  A/P: 37yo POD #2 s/p LTCS.  Patient is stable and doing well post-operatively.    - Continue regular diet.  - Increase ambulation as tolerated.   - Standing Ibuprofen and Acetaminophen for pain, Oxycodone available PRN for severe pain.  - DVT prophylaxis with Heparin 5000u BID    Amyeo Jereen PGY-1

## 2021-11-02 PROBLEM — Z78.9 OTHER SPECIFIED HEALTH STATUS: Chronic | Status: ACTIVE | Noted: 2021-08-22

## 2021-12-08 ENCOUNTER — APPOINTMENT (OUTPATIENT)
Dept: MAMMOGRAPHY | Facility: CLINIC | Age: 36
End: 2021-12-08

## 2021-12-08 ENCOUNTER — APPOINTMENT (OUTPATIENT)
Dept: ULTRASOUND IMAGING | Facility: CLINIC | Age: 36
End: 2021-12-08

## 2022-04-11 PROBLEM — Z11.59 SCREENING FOR VIRAL DISEASE: Status: ACTIVE | Noted: 2020-07-16

## 2022-10-19 ENCOUNTER — NON-APPOINTMENT (OUTPATIENT)
Age: 37
End: 2022-10-19

## 2023-03-02 NOTE — OB RN TRIAGE NOTE - NS_NPO_OBGYN_ALL_OB_DT
RENAL DAILY PROGRESS NOTE:    Date: 3/2/2023    Attending: Zion Monet MD     Problem List:   Principal Problem:    Hyponatremia      Subjective: Estuardo Holliday is a 71 year old male who was admitted on 3/1/2023 for severe hyponatremia.  3/2/2023 seen in ICU, he is on hypertonic saline drip. Continues with gen weakness.    ALLERGIES, MEDICATIONS REVIEWED    OBJECTIVE:   Vital 24 Hour Range Most Recent Value   Temperature Temp  Min: 97.3 °F (36.3 °C)  Max: 98.8 °F (37.1 °C) 98.4 °F (36.9 °C)   Pulse Pulse  Min: 59  Max: 72 61   Respiratory Resp  Min: 19  Max: 29 (!) 21   Blood Pressure BP  Min: 81/60  Max: 104/66 (!) 89/63   Pulse Oximetry SpO2  Min: 95 %  Max: 100 %    O2 No data recorded      Vital Most Recent Value First Value   Weight 54.5 kg (120 lb 2.4 oz) Weight: 54.5 kg (120 lb 2.4 oz)   Height 5' 6.5\" (168.9 cm) Height: 5' 6.5\" (168.9 cm)     I/O last 3 completed shifts:  In: 220 [P.O.:220]  Out: 655 [Urine:655]     Scheduled Medications:   Current Facility-Administered Medications   Medication Dose Route Frequency Provider Last Rate Last Admin   • levothyroxine (SYNTHROID, LEVOTHROID) tablet 75 mcg  75 mcg Oral Daily Zion Monet MD   75 mcg at 03/02/23 0705   • magnesium sulfate 2 g in 50 mL premix IVPB  2 g Intravenous Once Zion Monet MD       • hydroxychloroquine (PLAQUENIL) tablet 200 mg  200 mg Oral BID Zion Monet MD   200 mg at 03/01/23 1943   • oxybutynin (DITROPAN) tablet 5 mg  5 mg Oral BID Zion Monet MD   5 mg at 03/01/23 1943   • pantoprazole (PROTONIX) EC tablet 40 mg  40 mg Oral BID Zion Monet MD   40 mg at 03/01/23 1943   • predniSONE (DELTASONE) tablet 5 mg  5 mg Oral Daily Zion Monet MD       • tamsulosin (FLOMAX) capsule 0.4 mg  0.4 mg Oral QHS Zion Monet MD   0.4 mg at 03/01/23 1943   • sodium chloride (PF) 0.9 % injection 2 mL  2 mL Intracatheter 2 times per day Zion Monet MD       • enoxaparin (LOVENOX) injection 40 mg  40 mg Subcutaneous Daily Zion Monet MD    40 mg at 03/01/23 1704   • Potassium Standard Replacement Protocol (Levels 3.5 and lower)   Does not apply See Admin Instructions Zion Monet MD       • Magnesium Standard Replacement Protocol   Does not apply See Admin Instructions Zion Monet MD       • mycophenolate (CELLCEPT) capsule 1,000 mg  1,000 mg Oral BIDTX Zion Monet MD   1,000 mg at 03/01/23 1943   • magnesium oxide (MAG-OX) tablet 400 mg  400 mg Oral BID Stan Juarez MD   400 mg at 03/01/23 2110        Physical Exam:  GENERAL: NAD, awake and conversant   HEENT: AT,NC, anicteric sclera, OMMM  NECK: No JVD, No Carotid bruit   PULM: CTA  CVS: S1S2, no G/R/M  Abd: Soft, NT, ND, BS +ve   EXT: No LE edema  SKIN: Turgor good  NEURO:A & O x 3, no focal deficits   Lines: Rt internal jugular port       Laboratory Results:  Recent Labs   Lab 03/02/23  0604 03/02/23  0319 03/02/23  0015 03/01/23  1516 03/01/23  1320 03/01/23  1107   SODIUM 123*  122* 120* 120*   < > 119* 119*   POTASSIUM 3.8  --   --   --  3.8 3.8   CHLORIDE 89*  --   --   --  85* 86*   CO2 26  --   --   --  25 26   BUN 11  --   --   --  12 12   CREATININE 0.56*  --   --   --  0.59* 0.60*   GLUCOSE 79  --   --   --  106* 93   ALBUMIN 2.2*  --   --   --  2.4* 2.5*   AST 23  --   --   --  26 26   BILIRUBIN 0.3  --   --   --  0.5 0.5   TSH  --   --   --   --  0.033*  0.026*  --     < > = values in this interval not displayed.     Recent Labs   Lab 03/02/23  0604 03/01/23  1320 03/01/23  1107   WBC 7.0 8.2 8.5   HGB 8.8* 9.8* 10.1*   HCT 25.2* 28.5* 29.5*    146 173       CARDIAC  No results found  ENDO  Recent Labs   Lab 03/01/23  1320   TSH 0.033*  0.026*     LIPIDS  No results found  URINE  Recent Labs   Lab 03/01/23  1527 02/28/23  1531 02/24/23  1013   USPG 1.017  --  1.010   UPH 6.5  --  7.5*   UKET Negative  --  Negative   UPROT Trace*  --  Negative   UGLU Negative  --  Negative   UBILI Negative  --  Negative   UROB 0.2  --  0.2   UWBC Negative  --  Negative   UNITR Negative  --   Negative   URBC Negative  --  Negative   WAYLON 82 92  --    UCR  --  57.21  --    UOSM 482 540  --        Imaging:  XR CHEST AP OR PA    Result Date: 3/1/2023  EXAM: XR CHEST AP OR PA HISTORY: weakness COMPARISON: CT scan and chest x-ray February 18, 2023. TECHNIQUE: AP, portable upright view of the chest. FINDINGS: A right-sided central venous catheter remains in place. Overlying monitor leads and artifacts. Slight interval improvement in aeration at the right lung base. Otherwise, no significant interval change or improvement in the lungs when compared to the prior study. Minimal left apical pleural thickening appears stable. Stable cardiac silhouette and mediastinum. No significant pleural effusion or pneumothorax is identified. Osseous hypertrophic and degenerative changes.     IMPRESSION:  Slight interval improvement in aeration at the right lung base. Otherwise, no significant interval change or improvement in the lungs when compared to the prior study.         ASSESSMENT/PLAN: Estuardo Holliday 71 year old male is in the hospital for generalized weakness and severe hyponatremia.     Hyponatremia   - Na 119 mmol/l on admission   - SIADH pathophysiology based on urine chemistries: Urine Osm 540 mOsm/kg & 482 mOsm/kg. Urine Na was 92 mmol/l & 82 mmol/l.  - SIADH is likely related to pituitary mets.  - fluid restriction to 50 Oz per day   - start Hypertonic saline @ 15 ml/hr and cont until Na is closer to 125 mmol/l then will use Tolvaptan if liver function OK  - goal Na correction is 6-8 points in any given 24 hours. Thus far Na has improved to 123 mmol/l.     Thank you for allowing me to assist you in the care of this patient.     Kayla Sanchez MD  3/2/2023  8:07 AM                 21-Aug-2021 20:00

## 2023-04-18 NOTE — DISCHARGE NOTE OB - FUNCTIONAL SCREEN CURRENT LEVEL: BATHING, MLM
Pt arrives with [employer] for medical clearance after being tackled by officers and complaining of thumb pain. Upon arrival pt is screaming and shouting at officers. Pt is refusing vitals and treatment. Pt not answering questions at this time. Pt stated to provider \"I'll get everything done when I get out of [facility]\". Pt does not appear to be in any acute distress and RR appears to be even and unlabored. No bruises or lacerations are seen on the skin. Pt is bilateral forensic wrist restraints. 0 = independent

## 2023-10-09 ENCOUNTER — LABORATORY RESULT (OUTPATIENT)
Age: 38
End: 2023-10-09

## 2023-10-09 ENCOUNTER — APPOINTMENT (OUTPATIENT)
Dept: FAMILY MEDICINE | Facility: CLINIC | Age: 38
End: 2023-10-09
Payer: COMMERCIAL

## 2023-10-09 VITALS
BODY MASS INDEX: 27.12 KG/M2 | RESPIRATION RATE: 16 BRPM | SYSTOLIC BLOOD PRESSURE: 116 MMHG | TEMPERATURE: 98.6 F | DIASTOLIC BLOOD PRESSURE: 78 MMHG | HEART RATE: 78 BPM | HEIGHT: 62 IN | WEIGHT: 147.38 LBS | OXYGEN SATURATION: 96 %

## 2023-10-09 DIAGNOSIS — Z23 ENCOUNTER FOR IMMUNIZATION: ICD-10-CM

## 2023-10-09 DIAGNOSIS — R79.89 OTHER SPECIFIED ABNORMAL FINDINGS OF BLOOD CHEMISTRY: ICD-10-CM

## 2023-10-09 DIAGNOSIS — Z11.3 ENCOUNTER FOR SCREENING FOR INFECTIONS WITH A PREDOMINANTLY SEXUAL MODE OF TRANSMISSION: ICD-10-CM

## 2023-10-09 DIAGNOSIS — T14.8XXA OTHER INJURY OF UNSPECIFIED BODY REGION, INITIAL ENCOUNTER: ICD-10-CM

## 2023-10-09 DIAGNOSIS — Z00.00 ENCOUNTER FOR GENERAL ADULT MEDICAL EXAMINATION W/OUT ABNORMAL FINDINGS: ICD-10-CM

## 2023-10-09 PROCEDURE — 99395 PREV VISIT EST AGE 18-39: CPT | Mod: 25

## 2023-10-09 PROCEDURE — 90686 IIV4 VACC NO PRSV 0.5 ML IM: CPT

## 2023-10-09 PROCEDURE — G0008: CPT

## 2023-10-09 RX ORDER — FLUTICASONE PROPIONATE 50 UG/1
50 SPRAY, METERED NASAL TWICE DAILY
Qty: 1 | Refills: 1 | Status: DISCONTINUED | COMMUNITY
Start: 2019-07-09 | End: 2023-10-09

## 2023-10-09 RX ORDER — ERGOCALCIFEROL 1.25 MG/1
1.25 MG CAPSULE, LIQUID FILLED ORAL
Qty: 6 | Refills: 0 | Status: DISCONTINUED | COMMUNITY
Start: 2020-07-20 | End: 2023-10-09

## 2023-10-09 RX ORDER — NORGESTIMATE AND ETHINYL ESTRADIOL 0.25-0.035
0.25-35 KIT ORAL DAILY
Refills: 0 | Status: ACTIVE | COMMUNITY
Start: 2023-10-09

## 2023-10-11 LAB
25(OH)D3 SERPL-MCNC: 19 NG/ML
ALBUMIN SERPL ELPH-MCNC: 4.7 G/DL
ALP BLD-CCNC: 92 U/L
ALT SERPL-CCNC: 14 U/L
ANION GAP SERPL CALC-SCNC: 8 MMOL/L
APPEARANCE: CLEAR
AST SERPL-CCNC: 14 U/L
BASOPHILS # BLD AUTO: 0.03 K/UL
BASOPHILS NFR BLD AUTO: 0.4 %
BILIRUB SERPL-MCNC: <0.2 MG/DL
BILIRUBIN URINE: NEGATIVE
BLOOD URINE: NEGATIVE
BUN SERPL-MCNC: 11 MG/DL
C TRACH RRNA SPEC QL NAA+PROBE: NOT DETECTED
CALCIUM SERPL-MCNC: 9.8 MG/DL
CHLORIDE SERPL-SCNC: 103 MMOL/L
CHOLEST SERPL-MCNC: 160 MG/DL
CO2 SERPL-SCNC: 29 MMOL/L
COLOR: YELLOW
CREAT SERPL-MCNC: 0.64 MG/DL
EGFR: 117 ML/MIN/1.73M2
EOSINOPHIL # BLD AUTO: 0.06 K/UL
EOSINOPHIL NFR BLD AUTO: 0.9 %
ESTIMATED AVERAGE GLUCOSE: 108 MG/DL
GLUCOSE QUALITATIVE U: NEGATIVE MG/DL
GLUCOSE SERPL-MCNC: 100 MG/DL
HAV IGM SER QL: NONREACTIVE
HBA1C MFR BLD HPLC: 5.4 %
HBV CORE IGM SER QL: NONREACTIVE
HBV SURFACE AG SER QL: NONREACTIVE
HCT VFR BLD CALC: 40.3 %
HCV AB SER QL: NONREACTIVE
HCV S/CO RATIO: 0.06 S/CO
HDLC SERPL-MCNC: 56 MG/DL
HGB BLD-MCNC: 13 G/DL
HIV1+2 AB SPEC QL IA.RAPID: NONREACTIVE
IMM GRANULOCYTES NFR BLD AUTO: 0.1 %
KETONES URINE: NEGATIVE MG/DL
LDLC SERPL CALC-MCNC: 87 MG/DL
LEUKOCYTE ESTERASE URINE: ABNORMAL
LYMPHOCYTES # BLD AUTO: 2.12 K/UL
LYMPHOCYTES NFR BLD AUTO: 31.3 %
MAN DIFF?: NORMAL
MCHC RBC-ENTMCNC: 29.5 PG
MCHC RBC-ENTMCNC: 32.3 GM/DL
MCV RBC AUTO: 91.4 FL
MONOCYTES # BLD AUTO: 0.46 K/UL
MONOCYTES NFR BLD AUTO: 6.8 %
N GONORRHOEA RRNA SPEC QL NAA+PROBE: NOT DETECTED
NEUTROPHILS # BLD AUTO: 4.1 K/UL
NEUTROPHILS NFR BLD AUTO: 60.5 %
NITRITE URINE: NEGATIVE
NONHDLC SERPL-MCNC: 104 MG/DL
PH URINE: 6
PLATELET # BLD AUTO: 365 K/UL
POTASSIUM SERPL-SCNC: 4.3 MMOL/L
PROT SERPL-MCNC: 7.3 G/DL
PROTEIN URINE: NEGATIVE MG/DL
RBC # BLD: 4.41 M/UL
RBC # FLD: 13.3 %
SODIUM SERPL-SCNC: 140 MMOL/L
SOURCE AMPLIFICATION: NORMAL
SPECIFIC GRAVITY URINE: 1.02
T PALLIDUM AB SER QL IA: NEGATIVE
TRIGL SERPL-MCNC: 94 MG/DL
TSH SERPL-ACNC: 1.8 UIU/ML
TTG IGA SER IA-ACNC: <1.2 U/ML
TTG IGA SER-ACNC: NEGATIVE
TTG IGG SER IA-ACNC: 2.7 U/ML
TTG IGG SER IA-ACNC: NEGATIVE
UROBILINOGEN URINE: 0.2 MG/DL
WBC # FLD AUTO: 6.78 K/UL

## 2023-10-16 LAB
GI PCR PANEL: NOT DETECTED
H PYLORI AG STL QL: NEGATIVE

## 2023-11-30 ENCOUNTER — APPOINTMENT (OUTPATIENT)
Dept: GASTROENTEROLOGY | Facility: CLINIC | Age: 38
End: 2023-11-30
Payer: COMMERCIAL

## 2023-11-30 VITALS
TEMPERATURE: 97.1 F | HEIGHT: 62 IN | OXYGEN SATURATION: 97 % | WEIGHT: 147 LBS | SYSTOLIC BLOOD PRESSURE: 116 MMHG | BODY MASS INDEX: 27.05 KG/M2 | DIASTOLIC BLOOD PRESSURE: 70 MMHG | HEART RATE: 80 BPM

## 2023-11-30 DIAGNOSIS — R10.9 UNSPECIFIED ABDOMINAL PAIN: ICD-10-CM

## 2023-11-30 DIAGNOSIS — Z82.49 FAMILY HISTORY OF ISCHEMIC HEART DISEASE AND OTHER DISEASES OF THE CIRCULATORY SYSTEM: ICD-10-CM

## 2023-11-30 DIAGNOSIS — Z78.9 OTHER SPECIFIED HEALTH STATUS: ICD-10-CM

## 2023-11-30 DIAGNOSIS — K58.9 IRRITABLE BOWEL SYNDROME W/OUT DIARRHEA: ICD-10-CM

## 2023-11-30 DIAGNOSIS — Z83.49 FAMILY HISTORY OF OTHER ENDOCRINE, NUTRITIONAL AND METABOLIC DISEASES: ICD-10-CM

## 2023-11-30 PROCEDURE — 99203 OFFICE O/P NEW LOW 30 MIN: CPT

## 2023-11-30 RX ORDER — HYOSCYAMINE SULFATE 0.38 MG/1
0.38 TABLET, EXTENDED RELEASE ORAL
Qty: 30 | Refills: 3 | Status: ACTIVE | COMMUNITY
Start: 2023-11-30 | End: 1900-01-01

## 2023-11-30 RX ORDER — MAGNESIUM OXIDE/MAG AA CHELATE 300 MG
CAPSULE ORAL
Refills: 0 | Status: ACTIVE | COMMUNITY

## 2023-11-30 RX ORDER — UBIDECARENONE/VIT E ACET 100MG-5
CAPSULE ORAL
Refills: 0 | Status: ACTIVE | COMMUNITY

## 2023-11-30 RX ORDER — NORGESTIMATE AND ETHINYL ESTRADIOL 0.25-0.035
KIT ORAL
Refills: 0 | Status: ACTIVE | COMMUNITY

## 2024-04-01 ENCOUNTER — APPOINTMENT (OUTPATIENT)
Dept: FAMILY MEDICINE | Facility: CLINIC | Age: 39
End: 2024-04-01
Payer: COMMERCIAL

## 2024-04-01 VITALS
HEIGHT: 62 IN | OXYGEN SATURATION: 98 % | HEART RATE: 70 BPM | RESPIRATION RATE: 16 BRPM | WEIGHT: 145 LBS | TEMPERATURE: 98 F | BODY MASS INDEX: 26.68 KG/M2 | DIASTOLIC BLOOD PRESSURE: 64 MMHG | SYSTOLIC BLOOD PRESSURE: 105 MMHG

## 2024-04-01 DIAGNOSIS — M25.519 PAIN IN UNSPECIFIED SHOULDER: ICD-10-CM

## 2024-04-01 PROCEDURE — 99213 OFFICE O/P EST LOW 20 MIN: CPT

## 2024-04-01 NOTE — HISTORY OF PRESENT ILLNESS
[FreeTextEntry1] : shoulder pain follow up [de-identified] : 37 yo F presents for follow up for right shoulder pain. is doing physical therapy with minimal improvement. pain is worsening now has excruciating pain in the shoulder. with associated numbness/tingling down the arm to the fingertips. worse with movement flexion and piano playing. she is a  and uses her shoulder a lot to play instruments. has been taking aleve sparingly for pt sessions. has been doing pt for about 1 1/2 months.

## 2024-04-06 ENCOUNTER — NON-APPOINTMENT (OUTPATIENT)
Age: 39
End: 2024-04-06

## 2024-04-10 ENCOUNTER — APPOINTMENT (OUTPATIENT)
Dept: RADIOLOGY | Facility: IMAGING CENTER | Age: 39
End: 2024-04-10
Payer: COMMERCIAL

## 2024-04-10 ENCOUNTER — OUTPATIENT (OUTPATIENT)
Dept: OUTPATIENT SERVICES | Facility: HOSPITAL | Age: 39
LOS: 1 days | End: 2024-04-10
Payer: COMMERCIAL

## 2024-04-10 DIAGNOSIS — M25.519 PAIN IN UNSPECIFIED SHOULDER: ICD-10-CM

## 2024-04-10 PROCEDURE — 73030 X-RAY EXAM OF SHOULDER: CPT | Mod: 26,RT

## 2024-04-10 PROCEDURE — 73030 X-RAY EXAM OF SHOULDER: CPT

## 2024-04-24 ENCOUNTER — APPOINTMENT (OUTPATIENT)
Dept: ORTHOPEDIC SURGERY | Facility: CLINIC | Age: 39
End: 2024-04-24
Payer: COMMERCIAL

## 2024-04-24 VITALS
SYSTOLIC BLOOD PRESSURE: 112 MMHG | HEIGHT: 62 IN | HEART RATE: 73 BPM | OXYGEN SATURATION: 96 % | BODY MASS INDEX: 26.13 KG/M2 | WEIGHT: 142 LBS | DIASTOLIC BLOOD PRESSURE: 75 MMHG

## 2024-04-24 PROCEDURE — 20553 NJX 1/MLT TRIGGER POINTS 3/>: CPT

## 2024-04-24 PROCEDURE — 73030 X-RAY EXAM OF SHOULDER: CPT | Mod: RT

## 2024-04-24 PROCEDURE — 99204 OFFICE O/P NEW MOD 45 MIN: CPT | Mod: 25

## 2024-04-24 RX ORDER — CYCLOBENZAPRINE HYDROCHLORIDE 5 MG/1
5 TABLET, FILM COATED ORAL
Qty: 28 | Refills: 1 | Status: ACTIVE | COMMUNITY
Start: 2024-04-24 | End: 1900-01-01

## 2024-04-24 RX ORDER — DICLOFENAC SODIUM 50 MG/1
50 TABLET, DELAYED RELEASE ORAL
Qty: 28 | Refills: 0 | Status: ACTIVE | COMMUNITY
Start: 2024-04-24 | End: 1900-01-01

## 2024-05-01 ENCOUNTER — APPOINTMENT (OUTPATIENT)
Dept: ORTHOPEDIC SURGERY | Facility: CLINIC | Age: 39
End: 2024-05-01
Payer: COMMERCIAL

## 2024-05-01 VITALS
DIASTOLIC BLOOD PRESSURE: 87 MMHG | HEIGHT: 62 IN | SYSTOLIC BLOOD PRESSURE: 121 MMHG | WEIGHT: 142 LBS | BODY MASS INDEX: 26.13 KG/M2 | OXYGEN SATURATION: 99 % | HEART RATE: 83 BPM

## 2024-05-01 PROCEDURE — 20553 NJX 1/MLT TRIGGER POINTS 3/>: CPT

## 2024-06-05 ENCOUNTER — APPOINTMENT (OUTPATIENT)
Dept: ORTHOPEDIC SURGERY | Facility: CLINIC | Age: 39
End: 2024-06-05

## 2024-06-05 VITALS
OXYGEN SATURATION: 98 % | SYSTOLIC BLOOD PRESSURE: 115 MMHG | HEIGHT: 62 IN | WEIGHT: 142 LBS | RESPIRATION RATE: 16 BRPM | HEART RATE: 75 BPM | DIASTOLIC BLOOD PRESSURE: 77 MMHG | BODY MASS INDEX: 26.13 KG/M2

## 2024-06-05 DIAGNOSIS — M54.12 RADICULOPATHY, CERVICAL REGION: ICD-10-CM

## 2024-06-05 PROCEDURE — 20553 NJX 1/MLT TRIGGER POINTS 3/>: CPT

## 2024-06-13 PROBLEM — M54.12 CERVICAL RADICULAR PAIN: Status: ACTIVE | Noted: 2024-04-24

## 2025-03-03 NOTE — OB RN INTRAOPERATIVE NOTE - NSDELIVPROC_OBGYN_ALL_OB
 Section

## 2025-04-14 ENCOUNTER — APPOINTMENT (OUTPATIENT)
Dept: FAMILY MEDICINE | Facility: CLINIC | Age: 40
End: 2025-04-14
Payer: COMMERCIAL

## 2025-04-14 ENCOUNTER — NON-APPOINTMENT (OUTPATIENT)
Age: 40
End: 2025-04-14

## 2025-04-14 VITALS
BODY MASS INDEX: 25.76 KG/M2 | RESPIRATION RATE: 16 BRPM | SYSTOLIC BLOOD PRESSURE: 112 MMHG | OXYGEN SATURATION: 97 % | DIASTOLIC BLOOD PRESSURE: 70 MMHG | HEART RATE: 87 BPM | HEIGHT: 62 IN | WEIGHT: 140 LBS | TEMPERATURE: 98.2 F

## 2025-04-14 DIAGNOSIS — G56.01 CARPAL TUNNEL SYNDROME, RIGHT UPPER LIMB: ICD-10-CM

## 2025-04-14 DIAGNOSIS — Z00.00 ENCOUNTER FOR GENERAL ADULT MEDICAL EXAMINATION W/OUT ABNORMAL FINDINGS: ICD-10-CM

## 2025-04-14 DIAGNOSIS — R09.82 POSTNASAL DRIP: ICD-10-CM

## 2025-04-14 PROCEDURE — 99395 PREV VISIT EST AGE 18-39: CPT

## 2025-04-14 PROCEDURE — 36415 COLL VENOUS BLD VENIPUNCTURE: CPT

## 2025-04-15 LAB
25(OH)D3 SERPL-MCNC: 22.5 NG/ML
ALBUMIN SERPL ELPH-MCNC: 4.5 G/DL
ALP BLD-CCNC: 95 U/L
ALT SERPL-CCNC: 8 U/L
ANION GAP SERPL CALC-SCNC: 15 MMOL/L
AST SERPL-CCNC: 13 U/L
BASOPHILS # BLD AUTO: 0.02 K/UL
BASOPHILS NFR BLD AUTO: 0.2 %
BILIRUB SERPL-MCNC: 0.2 MG/DL
BUN SERPL-MCNC: 12 MG/DL
CALCIUM SERPL-MCNC: 9.7 MG/DL
CHLORIDE SERPL-SCNC: 102 MMOL/L
CHOLEST SERPL-MCNC: 176 MG/DL
CO2 SERPL-SCNC: 22 MMOL/L
CREAT SERPL-MCNC: 0.56 MG/DL
EGFRCR SERPLBLD CKD-EPI 2021: 120 ML/MIN/1.73M2
EOSINOPHIL # BLD AUTO: 0.07 K/UL
EOSINOPHIL NFR BLD AUTO: 0.7 %
ESTIMATED AVERAGE GLUCOSE: 114 MG/DL
GLUCOSE SERPL-MCNC: 89 MG/DL
HBA1C MFR BLD HPLC: 5.6 %
HCT VFR BLD CALC: 37.3 %
HDLC SERPL-MCNC: 57 MG/DL
HGB BLD-MCNC: 11.9 G/DL
IMM GRANULOCYTES NFR BLD AUTO: 0.3 %
LDLC SERPL-MCNC: 96 MG/DL
LYMPHOCYTES # BLD AUTO: 1.42 K/UL
LYMPHOCYTES NFR BLD AUTO: 13.7 %
MAN DIFF?: NORMAL
MCHC RBC-ENTMCNC: 28.8 PG
MCHC RBC-ENTMCNC: 31.9 G/DL
MCV RBC AUTO: 90.3 FL
MONOCYTES # BLD AUTO: 0.62 K/UL
MONOCYTES NFR BLD AUTO: 6 %
NEUTROPHILS # BLD AUTO: 8.22 K/UL
NEUTROPHILS NFR BLD AUTO: 79.1 %
NONHDLC SERPL-MCNC: 119 MG/DL
PLATELET # BLD AUTO: 329 K/UL
POTASSIUM SERPL-SCNC: 4 MMOL/L
PROT SERPL-MCNC: 7.3 G/DL
RBC # BLD: 4.13 M/UL
RBC # FLD: 14.1 %
SODIUM SERPL-SCNC: 140 MMOL/L
TRIGL SERPL-MCNC: 132 MG/DL
TSH SERPL-ACNC: 2.13 UIU/ML
WBC # FLD AUTO: 10.38 K/UL

## 2025-04-25 ENCOUNTER — TRANSCRIPTION ENCOUNTER (OUTPATIENT)
Age: 40
End: 2025-04-25

## 2025-07-04 NOTE — OB NEONATOLOGY/PEDIATRICIAN DELIVERY SUMMARY - NSMECDELIVBABYA_OBGYN_ALL_OB
Continuity of Care Form    Patient Name: Hollie Ma   :  1956  MRN:  [id number]    Admit date:  2025  Discharge date:  2025    Code Status Order: Full Code   Advance Directives:    Date/Time Healthcare Directive Type of Healthcare Directive Copy in Chart Healthcare Agent Appointed Healthcare Agent's Name Healthcare Agent's Phone Number    [phone] No, patient does not have an advance directive for healthcare treatment  --  --  --  --  --             Admitting Physician:  Salbador Guerrero MD  PCP: Naheed Naranjo MD    Discharging Nurse: KEVIN Palomino  Discharging Hospital Unit/Room#: X3T-9511/3126-01  Discharging Unit Phone Number: [phone]    Emergency Contact:   Extended Emergency Contact Information  Primary Emergency Contact: Mando Ma, [facility]  Home Phone: [phone]  Relation: Spouse  Preferred language: [place]   needed? Yes  Secondary Emergency Contact: Hollie Newton  Home Phone: [phone]  Mobile Phone: [phone]  Relation: Child   needed? No    Past Surgical History:  Past Surgical History:   Procedure Laterality Date    HIP SURGERY Right 2025    RIGHT HIP INTRAMEDULLARY NAILING, RIGHT HIP FRACTURE performed by Grzegorz Cevallos MD at [facility] OR    PARATHYROID GLAND SURGERY N/A 2023    PARATHYROIDECTOMY performed by Tanner Jones MD at [facility] OR    TOTAL KNEE ARTHROPLASTY Left 2023    LEFT TOTAL KNEE REPLACEMENT ROBOTIC ASSISTED performed by Grzegorz Cevallos MD at [facility] OR    TOTAL KNEE ARTHROPLASTY Right 3/20/2024    RIGHT TOTAL KNEE REPLACEMENT ROBOTIC ASSISTED performed by Grzegorz Cevallos MD at [facility] OR       Immunization History:   Immunization History   Administered Date(s) Administered    COVID-19, PFIZER PURPLE top, DILUTE for use, (age 12 y+), 30mcg/0.3mL 2021, 2021    Influenza, FLUAD, (age 65 y+), IM, Quadv, 0.5mL 2023    Influenza, FLUARIX, FLULAVAL, FLUZONE (age 6 mo+) and  yes 30 days.     Update Admission H&P: No change in H&P    PHYSICIAN SIGNATURE:  Electronically signed by Salbador Guerrero MD on 7/4/25 at 9:54 AM EDT